# Patient Record
Sex: FEMALE | Race: WHITE
[De-identification: names, ages, dates, MRNs, and addresses within clinical notes are randomized per-mention and may not be internally consistent; named-entity substitution may affect disease eponyms.]

---

## 2018-05-16 ENCOUNTER — HOSPITAL ENCOUNTER (EMERGENCY)
Dept: HOSPITAL 97 - ER | Age: 33
Discharge: HOME | End: 2018-05-16
Payer: COMMERCIAL

## 2018-05-16 DIAGNOSIS — R19.7: ICD-10-CM

## 2018-05-16 DIAGNOSIS — R11.10: ICD-10-CM

## 2018-05-16 DIAGNOSIS — F90.9: ICD-10-CM

## 2018-05-16 DIAGNOSIS — E87.6: Primary | ICD-10-CM

## 2018-05-16 DIAGNOSIS — Z72.0: ICD-10-CM

## 2018-05-16 LAB
ALBUMIN SERPL BCP-MCNC: 4.3 G/DL (ref 3.2–5.5)
ALP SERPL-CCNC: 68 IU/L (ref 42–121)
ALT SERPL W P-5'-P-CCNC: 11 IU/L (ref 10–60)
AST SERPL W P-5'-P-CCNC: 13 IU/L (ref 10–42)
BUN BLD-MCNC: 7 MG/DL (ref 6–20)
GLUCOSE SERPLBLD-MCNC: 91 MG/DL (ref 65–120)
HCT VFR BLD CALC: 48 % (ref 36–45)
LIPASE SERPL-CCNC: 18 U/L (ref 22–51)
LYMPHOCYTES # SPEC AUTO: 1.5 K/UL (ref 0.7–4.9)
MCH RBC QN AUTO: 29.7 PG (ref 27–35)
MCV RBC: 88.6 FL (ref 80–100)
PMV BLD: 8.8 FL (ref 7.6–11.3)
POTASSIUM SERPL-SCNC: 2.9 MEQ/L (ref 3.6–5)
RBC # BLD: 5.42 M/UL (ref 3.86–4.86)
UA COMPLETE W REFLEX CULTURE PNL UR: (no result)

## 2018-05-16 PROCEDURE — 85025 COMPLETE CBC W/AUTO DIFF WBC: CPT

## 2018-05-16 PROCEDURE — 96366 THER/PROPH/DIAG IV INF ADDON: CPT

## 2018-05-16 PROCEDURE — 81003 URINALYSIS AUTO W/O SCOPE: CPT

## 2018-05-16 PROCEDURE — 83690 ASSAY OF LIPASE: CPT

## 2018-05-16 PROCEDURE — 96375 TX/PRO/DX INJ NEW DRUG ADDON: CPT

## 2018-05-16 PROCEDURE — 99284 EMERGENCY DEPT VISIT MOD MDM: CPT

## 2018-05-16 PROCEDURE — 74177 CT ABD & PELVIS W/CONTRAST: CPT

## 2018-05-16 PROCEDURE — 96361 HYDRATE IV INFUSION ADD-ON: CPT

## 2018-05-16 PROCEDURE — 96365 THER/PROPH/DIAG IV INF INIT: CPT

## 2018-05-16 PROCEDURE — 81015 MICROSCOPIC EXAM OF URINE: CPT

## 2018-05-16 PROCEDURE — 81025 URINE PREGNANCY TEST: CPT

## 2018-05-16 PROCEDURE — 36415 COLL VENOUS BLD VENIPUNCTURE: CPT

## 2018-05-16 PROCEDURE — 80076 HEPATIC FUNCTION PANEL: CPT

## 2018-05-16 PROCEDURE — 80048 BASIC METABOLIC PNL TOTAL CA: CPT

## 2018-05-16 NOTE — ER
Nurse's Notes                                                                                     

 [facility]                                                                

Name: Merna Saul                                                                               

Age: 32 yrs                                                                                       

Sex: Female                                                                                       

: 1985                                                                                   

MRN: [id number]                                                                                   

Arrival Date: 2018                                                                          

Time: 16:22                                                                                       

Account#: [id number]                                                                            

Bed 7                                                                                             

Private MD: None, None                                                                            

Diagnosis: Diarrhea, unspecified;Vomiting;Abdominal and pelvic pain;Hypokalemia                   

                                                                                                  

Presentation:                                                                                     

                                                                                             

16:43 Presenting complaint: Patient states: N/V/D since Monday. Transition of care: patient   aj  

      was not received from another setting of care. Onset of symptoms was May 14, 2018. Care     

      prior to arrival: None.                                                                     

16:43 Method Of Arrival: Ambulatory                                                           aj  

16:43 Acuity: JODIE 2                                                                           aj  

17:51 Initial Sepsis Screen: Does the patient meet any 2 criteria? No. Patient's initial      ae1 

      sepsis screen is negative. Does the patient have a suspected source of infection? No.       

      Patient's initial sepsis screen is negative.                                                

                                                                                                  

Triage Assessment:                                                                                

16:45 General: Appears in no apparent distress. uncomfortable, Behavior is calm, cooperative, aj  

      appropriate for age. Pain: Complains of pain in epigastric area Pain currently is 7 out     

      of 10 on a pain scale. Neuro: Level of Consciousness is awake, alert, obeys commands,       

      Oriented to person, place, time, situation, Appropriate for age. Respiratory: Airway is     

      patent Respiratory effort is even, unlabored, Respiratory pattern is regular,               

      symmetrical. GI: Reports upper abdominal pain, diarrhea, nausea, vomiting. Derm: Skin       

      is intact, is healthy with good turgor, Skin is pink, warm \T\ dry. normal.                 

                                                                                                  

OB/GYN:                                                                                           

16:45 LMP N/A - Hysterectomy                                                                  aj  

                                                                                                  

Historical:                                                                                       

- Allergies:                                                                                      

16:45 No Known Allergies;                                                                     aj  

- Home Meds:                                                                                      

16:45 Ritalin 15mg Oral tab 3 times per day [Active]; Clonazepam Oral [Active]; Zofran Oral   aj  

      [Active];                                                                                   

- PMHx:                                                                                           

16:45 ADD/ADHD;                                                                               aj  

- PSHx:                                                                                           

16:45 Uterine septum removal; D \T\ C; Adhesion removal; ; Hysterectomy;               aj

                                                                                                  

- Immunization history:: Adult Immunizations up to date.                                          

- Social history:: Smoking status: Patient uses tobacco products, denies chronic                  

  smoking, but will smoke occasionally.                                                           

                                                                                                  

                                                                                                  

Screenin:50 Abuse screen: Denies threats or abuse. Nutritional screening: No deficits noted.        ae1 

      Tuberculosis screening: No symptoms or risk factors identified. Fall Risk None              

      identified.                                                                                 

                                                                                                  

Assessment:                                                                                       

17:00 Pain: Complains of pain in abdomen. Neuro: Level of Consciousness is awake, alert,      ae1 

      obeys commands, Oriented to person, place, time, situation. Cardiovascular: Heart tones     

      S1 S2 present Patient's skin is warm and dry. Respiratory: Airway is patent Respiratory     

      effort is even, unlabored, Respiratory pattern is regular, symmetrical, Breath sounds       

      are clear bilaterally. GI: Abdomen is round Bowel sounds present X 4 quads. hyperactive     

      in right lower quadrant and left lower quadrant. : No signs and/or symptoms were          

      reported regarding the genitourinary system. EENT: No signs and/or symptoms were            

      reported regarding the EENT system. Derm: Skin is pink, warm \T\ dry. Musculoskeletal: No   

      signs and/or symptoms reported regarding the musculoskeletal system.                        

17:00 General: Appears in no apparent distress. comfortable, Behavior is calm, cooperative.   ae1 

17:00 GI: Reports nausea, vomiting.                                                           ae1 

17:48 Reassessment: Pt finished drinking oral contrast, CT notified.                          jl7 

18:05 Reassessment: Potassium 2.9, provider notified, see MAR for orders. Provider ordered to [facility] 

      notify CT not to wait on oral contrast. CT notified to go ahead and get the pt at this      

      time.                                                                                       

19:32 Reassessment: Patient appears in no apparent distress at this time. Patient and/or      mg2 

      family updated on plan of care and expected duration. Pain level reassessed. Patient is     

      alert, oriented x 3, equal unlabored respirations, skin warm/dry/pink.                      

                                                                                                  

Vital Signs:                                                                                      

16:45 BP 97 / 79; Pulse 132; Resp 21; Temp 97.8; Pulse Ox 98% on R/A; Weight 63.5 kg; Height  aj  

      5 ft. 3 in. (160.02 cm); Pain 8/10;                                                         

18:00  / 86; Pulse 88; Resp 16; Pulse Ox 100% ;                                         jl7 

18:30  / 85; Pulse 90; Resp 16; Pulse Ox 99% ;                                          jl7 

18:40  / 85; Pulse 85; Resp 16; Pulse Ox 100% ;                                         jl7 

19:32 Pulse 100; Resp 18; Pulse Ox 100% on R/A; Pain 0/10;                                    mg2 

16:45 Body Mass Index 24.80 (63.50 kg, 160.02 cm)                                             aj  

                                                                                                  

ED Course:                                                                                        

16:22 Patient arrived in ED.                                                                  mr  

16:23 None, None is Private Physician.                                                        mr  

16:43 Triage completed.                                                                       aj  

16:45 Arm band placed on right wrist. Patient placed in an exam room.                         aj  

16:47 Palomo Rosas MD is Attending Physician.                                              kdr 

17:00 Bed in low position. Call light in reach. Side rails up X 1. Pulse ox on. NIBP on. Warm ae1 

      blanket given.                                                                              

17:00 Initial lab(s) drawn, by me. Inserted saline lock: 20 gauge in right antecubital area,  jb1 

      using aseptic technique. Blood collected.                                                   

17:17 Shemar Noriega, RN is Primary Nurse.                                                   ae1 

18:24 Patient moved to CT.                                                                    vm2 

18:25 CT Abd/Pelvis - W/Contrast In Process Unspecified.                                      EDMS

19:59 No provider procedures requiring assistance completed. IV discontinued, intact,         mg2 

      bleeding controlled, No redness/swelling at site. Pressure dressing applied.                

                                                                                                  

Administered Medications:                                                                         

17:35 Drug: Pepcid 20 mg Route: PO;                                                           jl7 

18:39 Follow up: Response: No adverse reaction                                                jl7 

17:36 Drug: NS 0.9% 1000 ml Route: IV; Rate: 1 bolus; Site: right antecubital;                jl7 

18:20 Follow up: Response: No adverse reaction; IV Status: Completed infusion                 jl7 

17:36 Drug: Zofran 4 mg Route: IVP; Site: right antecubital;                                  jl7 

17:45 Follow up: Response: No adverse reaction; Nausea is decreased                           jl7 

17:38 Drug: morphine 2 mg Route: IVP; Site: right antecubital;                                jl7 

18:39 Follow up: Response: No adverse reaction; Pain is decreased                             jl7 

17:44 CANCELLED (Other Intervention Used): Pepcid 20 mg IVP once                              jl7 

18:22 Not Given (Other Intervention Used): NS 0.9% 1000 ml IV at 1 bolus Per protocol; 1000   jl7 

      mL bolus                                                                                    

18:22 Drug: Potassium Chloride 40 mEq Route: PO;                                              jl7 

18:39 Follow up: Response: No adverse reaction                                                jl7 

18:30 Drug: Potassium Chloride 20 mEq Route: IV; Rate: calculated rate; Site: right           jl7 

      antecubital;                                                                                

20:01 Follow up: Response: No adverse reaction; IV Status: Completed infusion                 mg2 

19:08 Drug: LevaQUIN 500 mg Route: PO;                                                        mg2 

20:00 Follow up: Response: No adverse reaction                                                mg2 

19:08 Drug: Zofran 4 mg Route: IVP; Site: right antecubital;                                  mg2 

19:59 Follow up: Response: No adverse reaction; Nausea is decreased                           mg2 

19:09 Drug: LoMOTIL 2 tabs Route: PO;                                                         mg2 

20:00 Follow up: Response: No adverse reaction; Other; diarrhea decreased                     mg2 

19:09 Drug: Flagyl 500 mg Route: PO;                                                          mg2 

20:00 Follow up: Response: No adverse reaction                                                mg2 

                                                                                                  

                                                                                                  

Intake:                                                                                           

                                                                                                  

Outcome:                                                                                          

18:59 Discharge ordered by MD.                                                                kdr 

20:01 Discharged to home ambulatory, with family.                                             mg2 

20:01 Condition: stable                                                                           

20:01 Discharge instructions given to patient, family, Instructed on discharge instructions,      

      follow up and referral plans. Demonstrated understanding of instructions, follow-up         

      care, medications, Prescriptions given X 7                                                  

20:02 Patient left the ED.                                                                    mg2 

                                                                                                  

Signatures:                                                                                       

Dispatcher [employer]                           EDMS                                                 

Dominik Box                                 jb1                                                  

Chika Saravia RN RN aj Rittger, Kevin, MD MD kdr Rivera, Maria                                mr                                                   

Shemar Noriega RN                     RN   ae1                                                  

Kelsey Kidd RN                        RN   jl7                                                  

Josefina Cunningham                            2                                                  

Reji Spivey, SHANNAN                    RN   mg2                                                  

                                                                                                  

Corrections: (The following items were deleted from the chart)                                    

18:10 17:00 General: Appears in no apparent distress. comfortable, Behavior is calm,          ae1 

      cooperative, ae1                                                                            

18:45 18:41 Reassessment: lazaro willis 

                                                                                                  

**************************************************************************************************

## 2018-05-16 NOTE — RAD REPORT
EXAM DESCRIPTION:  CT - Abdomen   Pelvis W Contrast - 5/16/2018 6:26 pm

 

CLINICAL HISTORY:   Abdominal pain. Nausea and vomiting since Monday

 

COMPARISON:  None.

 

TECHNIQUE:  Computed axial tomography of the abdomen and pelvis was obtained. 100 cc Isovue-300 is ad
ministered intravenously. Oral contrast was given.

 

All CT scans are performed using dose optimization technique as appropriate and may include automated
 exposure control or mA/KV adjustment according to patient size.

 

FINDINGS:

The liver, spleen, pancreas, adrenals and kidneys appear unremarkable.

 

The appendix is normal caliber. There is no evidence of diverticulitis

 

Fluid is present within nondilated large and small bowel.

 

Air is present within the vagina. A hysterectomy has been performed

 

IMPRESSION:   Fluid within nondilated large and small bowel bowel may be secondary to an enteritis.

 

Air within the vagina often is insignificant. It can be associated with infection and should be corre
lated clinically

## 2018-05-16 NOTE — EDPHYS
Physician Documentation                                                                           

 [facility]                                                                

Name: Merna Saul                                                                               

Age: 32 yrs                                                                                       

Sex: Female                                                                                       

: 1985                                                                                   

MRN: [id number]                                                                                   

Arrival Date: 2018                                                                          

Time: 16:22                                                                                       

Account#: [id number]                                                                            

Bed 7                                                                                             

Private MD: None, None                                                                            

ED Physician Palomo Rosas                                                                       

HPI:                                                                                              

                                                                                             

18:42 This 32 yrs old  Female presents to ER via Ambulatory with complaints of       kdr 

      Vomiting/Diarrhea, Abdominal Pain.                                                          

18:42 The patient presents to the emergency department with nausea, that is moderate,         kdr 

      vomiting, that is intermittent, diarrhea, that is intermittent, abdominal pain, of the      

      epigastric area, right upper quadrant and left upper quadrant, described as achy,           

      burning, crampy, intermittent, vague,\E\ waxing and waning, and does not radiate. Onset:    

      The symptoms/episode began/occurred suddenly, Since the weekend some time. The patient      

      did have a steak on  for mothers day but has had n/v/d since then and has been        

      unable to keep her regular medications down. Possible causes: unknown. The symptoms are     

      aggravated by food , The symptoms are alleviated by nothing. Associated signs and           

      symptoms: Pertinent positives: abdominal pain, diarrhea, nausea, vomiting. Severity of      

      symptoms: At their worst the symptoms were moderate severe incapacitating this morning,     

      in the emergency department the symptoms have improved mildly. The patient has not          

      experienced similar symptoms in the past. The patient has not recently seen a physician.    

                                                                                                  

OB/GYN:                                                                                           

16:45 LMP N/A - Hysterectomy                                                                  aj  

                                                                                                  

Historical:                                                                                       

- Allergies:                                                                                      

16:45 No Known Allergies;                                                                     aj  

- Home Meds:                                                                                      

16:45 Ritalin 15mg Oral tab 3 times per day [Active]; Clonazepam Oral [Active]; Zofran Oral   aj  

      [Active];                                                                                   

- PMHx:                                                                                           

16:45 ADD/ADHD;                                                                               aj  

- PSHx:                                                                                           

16:45 Uterine septum removal; D \T\ C; Adhesion removal; ; Hysterectomy;               aj

                                                                                                  

- Immunization history:: Adult Immunizations up to date.                                          

- Social history:: Smoking status: Patient uses tobacco products, denies chronic                  

  smoking, but will smoke occasionally.                                                           

                                                                                                  

                                                                                                  

ROS:                                                                                              

18:42 Constitutional: Negative for fever, chills, and weight loss, Eyes: Negative for injury, kdr 

      pain, redness, and discharge, ENT: Negative for injury, pain, and discharge, Neck:          

      Negative for injury, pain, and swelling, Cardiovascular: Negative for chest pain,           

      palpitations, and edema, Respiratory: Negative for shortness of breath, cough,              

      wheezing, and pleuritic chest pain, Back: Negative for injury and pain, : Negative        

      for injury, bleeding, discharge, and swelling, MS/Extremity: Negative for injury and        

      deformity, Skin: Negative for injury, rash, and discoloration, Neuro: Negative for          

      headache, weakness, numbness, tingling, and seizure activity. Psych: Negative for           

      depression, anxiety, suicide ideation, homicidal ideation, and hallucinations,              

      Allergy/Immunology: Negative for hives, rash, and allergies, Endocrine: Negative for        

      neck swelling, polydipsia, polyuria, polyphagia, and marked weight changes,                 

      Hematologic/Lymphatic: Negative for swollen nodes, abnormal bleeding, and unusual           

      bruising.                                                                                   

18:42 Abdomen/GI: Positive for abdominal pain, nausea, vomiting, and diarrhea, abdominal          

      cramps, Negative for constipation, abdominal distension, anorexia, dysphagia,               

      hematemesis, black/tarry stool, rectal pain, rectal bleeding, bowel incontinence.           

                                                                                                  

Exam:                                                                                             

18:42 Constitutional:  This is a well developed, well nourished patient who is awake, alert,  kdr 

      and in no acute distress. Head/Face:  Normocephalic, atraumatic. Eyes:  Pupils equal        

      round and reactive to light, extra-ocular motions intact.  Lids and lashes normal.          

      Conjunctiva and sclera are non-icteric and not injected.  Cornea within normal limits.      

      Periorbital areas with no swelling, redness, or edema. Neck:  Trachea midline, no           

      thyromegaly or masses palpated, and no cervical lymphadenopathy.  Supple, full range of     

      motion without nuchal rigidity, or vertebral point tenderness.  No Meningismus.             

      Chest/axilla:  Normal chest wall appearance and motion.  Nontender with no deformity.       

      No lesions are appreciated. Cardiovascular:  Regular rate and rhythm with a normal S1       

      and S2.  No gallops, murmurs, or rubs.  Normal PMI, no JVD.  No pulse deficits.             

      Respiratory:  Lungs have equal breath sounds bilaterally, clear to auscultation and         

      percussion.  No rales, rhonchi or wheezes noted.  No increased work of breathing, no        

      retractions or nasal flaring. Back:  No spinal tenderness.  No costovertebral               

      tenderness.  Full range of motion. Skin:  Warm, dry with normal turgor.  Normal color       

      with no rashes, no lesions, and no evidence of cellulitis. MS/ Extremity:  Pulses           

      equal, no cyanosis.  Neurovascular intact.  Full, normal range of motion. Neuro:  Awake     

      and alert, GCS 15, oriented to person, place, time, and situation.  Cranial nerves          

      II-XII grossly intact.  Motor strength 5/5 in all extremities.  Sensory grossly intact.     

       Cerebellar exam normal.  Normal gait. Psych:  Awake, alert, with orientation to            

      person, place and time.  Behavior, mood, and affect are within normal limits.               

                                                                                                  

Vital Signs:                                                                                      

16:45 BP 97 / 79; Pulse 132; Resp 21; Temp 97.8; Pulse Ox 98% on R/A; Weight 63.5 kg; Height  aj  

      5 ft. 3 in. (160.02 cm); Pain 8/10;                                                         

18:00  / 86; Pulse 88; Resp 16; Pulse Ox 100% ;                                         jl7 

18:30  / 85; Pulse 90; Resp 16; Pulse Ox 99% ;                                          jl7 

18:40  / 85; Pulse 85; Resp 16; Pulse Ox 100% ;                                         jl7 

19:32 Pulse 100; Resp 18; Pulse Ox 100% on R/A; Pain 0/10;                                    mg2 

16:45 Body Mass Index 24.80 (63.50 kg, 160.02 cm)                                             aj  

                                                                                                  

MDM:                                                                                              

18:59 Patient medically screened.                                                             kdr 

19:03 Data reviewed: vital signs, nurses notes, lab test result(s), radiologic studies.       kdr 

      Counseling: I had a detailed discussion with the patient and/or guardian regarding: the     

      historical points, exam findings, and any diagnostic results supporting the                 

      discharge/admit diagnosis, lab results, radiology results, the need for outpatient          

      follow up. ED course: The patient was feeling much better and able to keep PO fluids        

      down.                                                                                       

                                                                                                  

                                                                                             

17:09 Order name: Basic Metabolic Panel; Complete Time: 18:52                                 kdr 

                                                                                             

17:09 Order name: CBC with Diff; Complete Time: 18:52                                         kdr 

                                                                                             

17:09 Order name: Creatinine for Radiology; Complete Time: 18:52                              kdr 

                                                                                             

17:09 Order name: Hepatic Function; Complete Time: 18:52                                      kdr 

                                                                                             

17:09 Order name: Lipase; Complete Time: 18:52                                                kdr 

                                                                                             

17:09 Order name: Urine Microscopic Only; Complete Time: 18:52                                kdr 

                                                                                             

17:09 Order name: CT Abd/Pelvis - W/Contrast; Complete Time: 18:52                            kdr 

                                                                                             

17:46 Order name: Urine Dipstick--Ancillary (enter results); Complete Time: 18:52             em1 

                                                                                             

17:46 Order name: Urine Pregnancy--Ancillary (enter results); Complete Time: 18:52            em1 

                                                                                             

17:09 Order name: IV Saline Lock; Complete Time: 17:46                                        kdr 

                                                                                             

17:09 Order name: Labs collected and sent; Complete Time: 17:46                               kdr 

                                                                                             

17:09 Order name: Urine Dipstick-Ancillary (obtain specimen); Complete Time: 17:44            kdr 

                                                                                             

17:09 Order name: Misc. Order: Few ice chips; Complete Time: 17:46                            kdr 

                                                                                                  

Administered Medications:                                                                         

17:35 Drug: Pepcid 20 mg Route: PO;                                                           jl7 

18:39 Follow up: Response: No adverse reaction                                                jl7 

17:36 Drug: NS 0.9% 1000 ml Route: IV; Rate: 1 bolus; Site: right antecubital;                jl7 

18:20 Follow up: Response: No adverse reaction; IV Status: Completed infusion                 jl7 

17:36 Drug: Zofran 4 mg Route: IVP; Site: right antecubital;                                  jl7 

17:45 Follow up: Response: No adverse reaction; Nausea is decreased                           jl7 

17:38 Drug: morphine 2 mg Route: IVP; Site: right antecubital;                                jl7 

18:39 Follow up: Response: No adverse reaction; Pain is decreased                             jl7 

17:44 CANCELLED (Other Intervention Used): Pepcid 20 mg IVP once                              jl7 

18:22 Not Given (Other Intervention Used): NS 0.9% 1000 ml IV at 1 bolus Per protocol; 1000   jl7 

      mL bolus                                                                                    

18:22 Drug: Potassium Chloride 40 mEq Route: PO;                                              jl7 

18:39 Follow up: Response: No adverse reaction                                                jl7 

18:30 Drug: Potassium Chloride 20 mEq Route: IV; Rate: calculated rate; Site: right           jl7 

      antecubital;                                                                                

20:01 Follow up: Response: No adverse reaction; IV Status: Completed infusion                 mg2 

19:08 Drug: LevaQUIN 500 mg Route: PO;                                                        mg2 

20:00 Follow up: Response: No adverse reaction                                                mg2 

19:08 Drug: Zofran 4 mg Route: IVP; Site: right antecubital;                                  mg2 

19:59 Follow up: Response: No adverse reaction; Nausea is decreased                           mg2 

19:09 Drug: LoMOTIL 2 tabs Route: PO;                                                         mg2 

20:00 Follow up: Response: No adverse reaction; Other; diarrhea decreased                     mg2 

19:09 Drug: Flagyl 500 mg Route: PO;                                                          mg2 

20:00 Follow up: Response: No adverse reaction                                                mg2 

                                                                                                  

                                                                                                  

Disposition:                                                                                      

18 18:59 Discharged to Home. Impression: Diarrhea, unspecified, Vomiting, Abdominal and     

  pelvic pain, Hypokalemia.                                                                       

- Condition is Stable.                                                                            

- Discharge Instructions: Potassium Content of Foods, Nausea and Vomiting,                        

  Easy-to-Read, Abdominal Pain, Adult, Easy-to-Read, Hypokalemia.                                 

- Prescriptions for Bentyl 20 mg Oral Tablet - take 1 tablet by ORAL route every 6                

  hours As needed; 20 tablet. Cipro 500 mg Oral Tablet - take 1 tablet by ORAL route              

  every 12 hours for 10 days; 20 tablet. Flagyl 500 mg Oral Tablet - take 1 tablet by             

  ORAL route every 6 hours for 10 days; 40 tablet. Pepcid 20 mg Oral Tablet - take 1              

  tablet by ORAL route every 12 hours for 5 days; 10 tablet. Xanax 1 mg Oral Tablet -             

  take 1 tablet by ORAL route every 8 hours As needed; 6 tablet. Tramadol 50 mg Oral              

  Tablet - take 1 tablet by ORAL route every 8 hours as needed; 12 tablet. Lomotil 2.5-           

  0.025 mg Oral Tablet - take 1 tablet by ORAL route every 6 hours As needed; 20 tablet.          

- Medication Reconciliation Form, Thank You Letter, Antibiotic Education, Prescription            

  Opioid Use form.                                                                                

- Follow up: Private Physician; When: 2 - 3 days; Reason: If symptoms return, Further             

  diagnostic work-up, Recheck today's complaints, Continuance of care, Re-evaluation by           

  your physician.                                                                                 

- Problem is new.                                                                                 

- Symptoms have improved.                                                                         

                                                                                                  

                                                                                                  

                                                                                                  

Signatures:                                                                                       

Dispatcher [employer]                           Chika Carr RN                       Palomo Sanchez MD MD kdr Leal, Jahala, RN RN   jl7                                                  

Reji Spivey RN                    RN   mg2                                                  

                                                                                                  

Corrections: (The following items were deleted from the chart)                                    

17:44 17:09 Pepcid 20 mg IVP once ordered. arianna                                                jl7 

20:02 18:59 2018 18:59 Discharged to Home. Impression: Diarrhea, unspecified; Vomiting; mg2 

      Abdominal and pelvic pain; Hypokalemia. Condition is Stable. Forms are Medication           

      Reconciliation Form, Thank You Letter, Antibiotic Education, Prescription Opioid Use.       

      Follow up: Private Physician; When: 2 - 3 days; Reason: If symptoms return, Further         

      diagnostic work-up, Recheck today's complaints, Continuance of care, Re-evaluation by       

      your physician. Problem is new. Symptoms have improved. kdr                                 

                                                                                                  

**************************************************************************************************

## 2018-08-13 ENCOUNTER — HOSPITAL ENCOUNTER (EMERGENCY)
Dept: HOSPITAL 97 - ER | Age: 33
Discharge: HOME | End: 2018-08-13
Payer: COMMERCIAL

## 2018-08-13 DIAGNOSIS — Z72.0: ICD-10-CM

## 2018-08-13 DIAGNOSIS — J06.9: Primary | ICD-10-CM

## 2018-08-13 DIAGNOSIS — F90.9: ICD-10-CM

## 2018-08-13 PROCEDURE — 87070 CULTURE OTHR SPECIMN AEROBIC: CPT

## 2018-08-13 PROCEDURE — 36415 COLL VENOUS BLD VENIPUNCTURE: CPT

## 2018-08-13 PROCEDURE — 86308 HETEROPHILE ANTIBODY SCREEN: CPT

## 2018-08-13 PROCEDURE — 71046 X-RAY EXAM CHEST 2 VIEWS: CPT

## 2018-08-13 PROCEDURE — 87081 CULTURE SCREEN ONLY: CPT

## 2018-08-13 PROCEDURE — 99283 EMERGENCY DEPT VISIT LOW MDM: CPT

## 2018-08-13 NOTE — ER
Nurse's Notes                                                                                     

 [facility]                                                                

Name: Merna Saul                                                                               

Age: 32 yrs                                                                                       

Sex: Female                                                                                       

: 1985                                                                                   

MRN: [id number]                                                                                   

Arrival Date: 2018                                                                          

Time: 15:45                                                                                       

Account#: [id number]                                                                            

Bed Treatment                                                                                     

Private MD: Out, [facility]                                                                    

Diagnosis: Acute upper respiratory infection, unspecified                                         

                                                                                                  

Presentation:                                                                                     

                                                                                             

16:08 Presenting complaint: Patient states: Started feeling bad for a week now, sore throat,  jl7 

      weakness, coughing. Transition of care: patient was not received from another setting       

      of care. Onset of symptoms was 2018. Risk Assessment: Do you want to hurt        

      yourself or someone else? Patient reports no desire to harm self or others. Initial         

      Sepsis Screen: Does the patient meet any 2 criteria? No. Patient's initial sepsis           

      screen is negative. Does the patient have a suspected source of infection? No.              

      Patient's initial sepsis screen is negative. Care prior to arrival: None.                   

16:08 Method Of Arrival: Ambulatory                                                           [facility] 

16:08 Acuity: JODIE 4                                                                           jl7 

                                                                                                  

Triage Assessment:                                                                                

16:10 General: Appears in no apparent distress. uncomfortable, Behavior is calm, cooperative, jl7 

      appropriate for age. Pain: Complains of pain in headache/neck Pain does not radiate.        

      Pain currently is 5 out of 10 on a pain scale. Quality of pain is described as              

      throbbing. Neuro: Level of Consciousness is awake, alert, obeys commands, Oriented to       

      person, place, time. Cardiovascular: Patient's skin is warm and dry. Respiratory:           

      Airway is patent Respiratory effort is even, unlabored, Respiratory pattern is regular,     

      symmetrical. GI: Reports nausea, Patient currently denies diarrhea, vomiting. : No        

      signs and/or symptoms were reported regarding the genitourinary system. Derm: Skin is       

      pink, warm \T\ dry. Musculoskeletal: No signs and/or symptoms reported regarding the        

      musculoskeletal system.                                                                     

                                                                                                  

OB/GYN:                                                                                           

16:10 LMP N/A - Hysterectomy                                                                  jl7 

                                                                                                  

Historical:                                                                                       

- Allergies:                                                                                      

16:10 No Known Allergies;                                                                     jl7 

- Home Meds:                                                                                      

16:10 Clonazepam Oral [Active]; Ritalin 15mg Oral tab 3 times per day [Active];               jl7 

- PMHx:                                                                                           

16:10 ADD/ADHD;                                                                               jl7 

- PSHx:                                                                                           

16:10 Uterine septum removal; D \T\ C; Adhesion removal; ; Hysterectomy;               jl7

                                                                                                  

- Immunization history:: Adult Immunizations.                                                     

- Social history:: Smoking status: Patient uses tobacco products, denies chronic                  

  smoking, but will smoke occasionally.                                                           

- Ebola Screening: : No symptoms or risks identified at this time.                                

                                                                                                  

                                                                                                  

Vital Signs:                                                                                      

16:10  / 76; Pulse 99; Resp 22 S; Temp 97.8(O); Pulse Ox 100% on R/A; Weight 61.23 kg   jl7 

      (R); Height 5 ft. 2 in. (157.48 cm) (R); Pain 5/10;                                         

16:10 Body Mass Index 24.69 (61.23 kg, 157.48 cm)                                             jl7 

                                                                                                  

ED Course:                                                                                        

15:45 Patient arrived in ED.                                                                  sb2 

15:45 Out, of Town is Private Physician.                                                      sb2 

16:03 Kelsey Kidd, RN is Primary Nurse.                                                      jl7 

16:08 [facility]                                                        kb  

16:08 Melvin Elizabeth MD is Attending Physician.                                             kb  

16:09 Triage completed.                                                                       jl7 

16:10 Arm band placed on right wrist.                                                         jl7 

16:46 Initial lab(s) drawn, by me, sent to lab. Strep swab sent to lab. Inserted saline lock: iw  

      20 gauge in left antecubital area, using aseptic technique. Blood collected.                

17:22 Chest Pa And Lat (2 Views) XRAY In Process Unspecified.                                 EDMS

                                                                                                  

Administered Medications:                                                                         

No medications were administered                                                                  

                                                                                                  

                                                                                                  

Outcome:                                                                                          

17:56 Discharge ordered by MD.                                                                kb  

18:30 Patient left the ED.                                                                    iw  

                                                                                                  

Signatures:                                                                                       

Dispatcher [employer]                           EDMS                                                 

Zora Gama FNP-C FNP-Ckb Williams, Irene, RN                     RN   iw                                                   

Kelsey Kidd, RN                        RN   jl7                                                  

Cindy Yo                               sb2                                                  

                                                                                                  

**************************************************************************************************

## 2018-08-13 NOTE — RAD REPORT
EXAM DESCRIPTION:  Celso Harmon (2 Views)8/13/2018 5:22 pm

 

CLINICAL HISTORY:  Cough

 

COMPARISON:  None

 

FINDINGS:   The lungs appear clear of acute infiltrate. The heart is normal size

 

IMPRESSION:   No acute abnormalities displayed

## 2018-08-13 NOTE — EDPHYS
Physician Documentation                                                                           

 [facility]                                                                

Name: Merna Saul                                                                               

Age: 32 yrs                                                                                       

Sex: Female                                                                                       

: 1985                                                                                   

MRN: [id number]                                                                                   

Arrival Date: 2018                                                                          

Time: 15:45                                                                                       

Account#: [id number]                                                                            

Bed Treatment                                                                                     

Private MD: Out, [facility]                                                                    

ED Physician Melvin Elizabeth                                                                      

HPI:                                                                                              

                                                                                             

17:27 This 32 yrs old  Female presents to ER via Ambulatory with complaints of Flu   kb  

      Symptoms.                                                                                   

17:27 The patient or guardian reports cough, that is intermittent, described as moderate,     kb  

      with no sputum, flu symptoms, low-grade fever, myalgias. Onset: The symptoms/episode        

      began/occurred 1 week(s) ago. Severity of symptoms: At their worst the symptoms were        

      mild, moderate, in the emergency department the symptoms are unchanged. Modifying           

      factors: The symptoms are alleviated by nothing, the symptoms are aggravated by             

      nothing. Associated signs and symptoms: Pertinent positives: fever, rhinorrhea, sore        

      throat, Pertinent negatives: chest pain, diarrhea, ear ache, nausea, vomiting. The          

      patient has not experienced similar symptoms in the past. The patient has not recently      

      seen a physician.                                                                           

                                                                                                  

OB/GYN:                                                                                           

16:10 LMP N/A - Hysterectomy                                                                  jl7 

                                                                                                  

Historical:                                                                                       

- Allergies:                                                                                      

16:10 No Known Allergies;                                                                     jl7 

- Home Meds:                                                                                      

16:10 Clonazepam Oral [Active]; Ritalin 15mg Oral tab 3 times per day [Active];               jl7 

- PMHx:                                                                                           

16:10 ADD/ADHD;                                                                               jl7 

- PSHx:                                                                                           

16:10 Uterine septum removal; D \T\ C; Adhesion removal; ; Hysterectomy;               jl7

                                                                                                  

- Immunization history:: Adult Immunizations.                                                     

- Social history:: Smoking status: Patient uses tobacco products, denies chronic                  

  smoking, but will smoke occasionally.                                                           

- Ebola Screening: : No symptoms or risks identified at this time.                                

                                                                                                  

                                                                                                  

ROS:                                                                                              

17:24 Cardiovascular: Negative for chest pain, palpitations, and edema, Abdomen/GI: Negative  kb  

      for abdominal pain, nausea, vomiting, diarrhea, and constipation, Back: Negative for        

      injury and pain, : Negative for injury, bleeding, discharge, and swelling,                

      MS/Extremity: Negative for injury and deformity, Skin: Negative for injury, rash, and       

      discoloration, Neuro: Negative for headache, weakness, numbness, tingling, and seizure.     

17:24 Constitutional: Positive for body aches, chills, fatigue, fever, malaise, Negative for      

      poor PO intake, weight loss.                                                                

17:24 ENT: Positive for rhinorrhea, sinus congestion, sinus pain, sore throat.                    

17:24 Respiratory: Positive for cough, Negative for dyspnea on exertion, hemoptysis,              

      orthopnea, pleurisy, shortness of breath, sputum production, wheezing.                      

                                                                                                  

Exam:                                                                                             

17:24 Constitutional:  This is a well developed, well nourished patient who is awake, alert,  kb  

      and in no acute distress. Head/Face:  Normocephalic, atraumatic. Neck:  Trachea             

      midline, no thyromegaly or masses palpated, and no cervical lymphadenopathy.  Supple,       

      full range of motion without nuchal rigidity, or vertebral point tenderness.  No            

      Meningismus. Chest/axilla:  Normal chest wall appearance and motion.  Nontender with no     

      deformity.  No lesions are appreciated. Cardiovascular:  Regular rate and rhythm with a     

      normal S1 and S2.  No gallops, murmurs, or rubs.  Normal PMI, no JVD.  No pulse             

      deficits. Abdomen/GI:  Soft, non-tender, with normal bowel sounds.  No distension or        

      tympany.  No guarding or rebound.  No evidence of tenderness throughout. Back:  No          

      spinal tenderness.  No costovertebral tenderness.  Full range of motion. Skin:  Warm,       

      dry with normal turgor.  Normal color with no rashes, no lesions, and no evidence of        

      cellulitis. MS/ Extremity:  Pulses equal, no cyanosis.  Neurovascular intact.  Full,        

      normal range of motion. Neuro:  Awake and alert, GCS 15, oriented to person, place,         

      time, and situation.  Cranial nerves II-XII grossly intact.  Motor strength 5/5 in all      

      extremities.  Sensory grossly intact.  Cerebellar exam normal.  Normal gait.                

17:24 ENT: Posterior pharynx: Airway: normal, no evidence of obstruction, Tonsils:                

      bilaterally enlarged, with erythema, Uvula: normal, midline, swelling, that is mild,        

      erythema, that is mild.                                                                     

17:24 Respiratory: the patient does not display signs of respiratory distress,  Respirations:     

      normal, Breath sounds: rhonchi, that are mild, are heard in the  left posterior lower       

      lobe.                                                                                       

                                                                                                  

Vital Signs:                                                                                      

16:10  / 76; Pulse 99; Resp 22 S; Temp 97.8(O); Pulse Ox 100% on R/A; Weight 61.23 kg   jl7 

      (R); Height 5 ft. 2 in. (157.48 cm) (R); Pain 5/10;                                         

16:10 Body Mass Index 24.69 (61.23 kg, 157.48 cm)                                             jl7 

                                                                                                  

MDM:                                                                                              

16:08 Patient medically screened.                                                             kb  

16:15 Patient medically screened.                                                             kb  

17:27 Data reviewed: vital signs, nurses notes. Data interpreted: Pulse oximetry: on room air kb  

      is 100 %. Interpretation: normal.                                                           

17:52 Counseling: I had a detailed discussion with the patient and/or guardian regarding: the kb  

      historical points, exam findings, and any diagnostic results supporting the                 

      discharge/admit diagnosis, lab results, radiology results, the need for outpatient          

      follow up, a family practitioner, to return to the emergency department if symptoms         

      worsen or persist or if there are any questions or concerns that arise at home.             

                                                                                                  

                                                                                             

16:24 Order name: Mono Screen Profile; Complete Time: 17:51                                   kb  

                                                                                             

16:24 Order name: Strep; Complete Time: 17:12                                                 kb  

                                                                                             

16:24 Order name: Chest Pa And Lat (2 Views) XRAY; Complete Time: 17:28                       kb  

                                                                                             

17:12 Order name: Throat Culture                                                              EDMS

                                                                                                  

Administered Medications:                                                                         

No medications were administered                                                                  

                                                                                                  

                                                                                                  

Disposition:                                                                                      

                                                                                             

11:36 Co-signature as Attending Physician, Melvin Elizabeth MD I agree with the assessment and  yodit 

      plan of care.                                                                               

                                                                                                  

Disposition:                                                                                      

18 17:56 Discharged to Home. Impression: Acute upper respiratory infection, unspecified.    

- Condition is Stable.                                                                            

- Discharge Instructions: Upper Respiratory Infection, Adult, Easy-to-Read.                       

- Prescriptions for Prednisone 20 mg Oral Tablet - take 1 tablet by ORAL route once               

  daily for 5 days; 5 tablet. Tessalon Perles 100 mg Oral Capsule - take 1 capsule by             

  ORAL route every 8 hours As needed; 15 capsule.                                                 

- Medication Reconciliation Form, Thank You Letter, Antibiotic Education, Prescription            

  Opioid Use, Work release form form.                                                             

- Follow up: Emergency Department; When: As needed; Reason: Worsening of condition.               

  Follow up: Private Physician; When: 2 - 3 days; Reason: Recheck today's complaints,             

  Continuance of care, Re-evaluation by your physician.                                           

                                                                                                  

                                                                                                  

                                                                                                  

Signatures:                                                                                       

Dispatcher [employer]                           EDMS                                                 

Zora Gama, LINDSAY-EDGARDO MONTOYA-Melvin Shane MD MD cha Williams, Irene, RN                     RN   brooke Langeal, Jahala, RN                        RN   jl7                                                  

                                                                                                  

Corrections: (The following items were deleted from the chart)                                    

                                                                                             

18:30 17:56 2018 17:56 Discharged to Home. Impression: Acute upper respiratory          iw  

      infection, unspecified. Condition is Stable. Forms are Medication Reconciliation Form,      

      Thank You Letter, Antibiotic Education, Prescription Opioid Use. Follow up: Emergency       

      Department; When: As needed; Reason: Worsening of condition. Follow up: Private             

      Physician; When: 2 - 3 days; Reason: Recheck today's complaints, Continuance of care,       

      Re-evaluation by your physician. kb                                                         

                                                                                                  

**************************************************************************************************